# Patient Record
Sex: MALE | Race: BLACK OR AFRICAN AMERICAN | NOT HISPANIC OR LATINO
[De-identification: names, ages, dates, MRNs, and addresses within clinical notes are randomized per-mention and may not be internally consistent; named-entity substitution may affect disease eponyms.]

---

## 2021-03-31 ENCOUNTER — APPOINTMENT (OUTPATIENT)
Dept: DISASTER EMERGENCY | Facility: OTHER | Age: 21
End: 2021-03-31
Payer: COMMERCIAL

## 2021-03-31 PROCEDURE — 0011A: CPT

## 2021-04-28 ENCOUNTER — APPOINTMENT (OUTPATIENT)
Dept: DISASTER EMERGENCY | Facility: OTHER | Age: 21
End: 2021-04-28

## 2021-04-29 ENCOUNTER — APPOINTMENT (OUTPATIENT)
Dept: DISASTER EMERGENCY | Facility: OTHER | Age: 21
End: 2021-04-29
Payer: COMMERCIAL

## 2021-04-29 PROBLEM — Z00.00 ENCOUNTER FOR PREVENTIVE HEALTH EXAMINATION: Status: ACTIVE | Noted: 2021-04-29

## 2021-04-29 PROCEDURE — 0012A: CPT

## 2021-12-01 ENCOUNTER — OUTPATIENT (OUTPATIENT)
Dept: OUTPATIENT SERVICES | Facility: HOSPITAL | Age: 21
LOS: 1 days | End: 2021-12-01
Payer: COMMERCIAL

## 2021-12-01 ENCOUNTER — TRANSCRIPTION ENCOUNTER (OUTPATIENT)
Age: 21
End: 2021-12-01

## 2021-12-01 ENCOUNTER — APPOINTMENT (OUTPATIENT)
Dept: PULMONOLOGY | Facility: CLINIC | Age: 21
End: 2021-12-01
Payer: COMMERCIAL

## 2021-12-01 VITALS
RESPIRATION RATE: 16 BRPM | OXYGEN SATURATION: 99 % | BODY MASS INDEX: 27.17 KG/M2 | DIASTOLIC BLOOD PRESSURE: 66 MMHG | HEART RATE: 62 BPM | HEIGHT: 73 IN | SYSTOLIC BLOOD PRESSURE: 100 MMHG | WEIGHT: 205 LBS

## 2021-12-01 DIAGNOSIS — Z78.9 OTHER SPECIFIED HEALTH STATUS: ICD-10-CM

## 2021-12-01 DIAGNOSIS — R06.02 SHORTNESS OF BREATH: ICD-10-CM

## 2021-12-01 DIAGNOSIS — Z13.9 ENCOUNTER FOR SCREENING, UNSPECIFIED: ICD-10-CM

## 2021-12-01 DIAGNOSIS — Z80.3 FAMILY HISTORY OF MALIGNANT NEOPLASM OF BREAST: ICD-10-CM

## 2021-12-01 DIAGNOSIS — Z82.49 FAMILY HISTORY OF ISCHEMIC HEART DISEASE AND OTHER DISEASES OF THE CIRCULATORY SYSTEM: ICD-10-CM

## 2021-12-01 PROCEDURE — 99203 OFFICE O/P NEW LOW 30 MIN: CPT

## 2021-12-01 PROCEDURE — 71046 X-RAY EXAM CHEST 2 VIEWS: CPT | Mod: 26

## 2021-12-01 RX ORDER — ALBUTEROL SULFATE 90 UG/1
108 (90 BASE) INHALANT RESPIRATORY (INHALATION)
Qty: 1 | Refills: 5 | Status: ACTIVE | COMMUNITY
Start: 2021-12-01 | End: 1900-01-01

## 2021-12-01 NOTE — HISTORY OF PRESENT ILLNESS
[Never] : never [TextBox_4] : 21M with no significant PMH who presents for initial pulmonary evaluation of SOB. He has been feeling SOB with exertion over the past 2 months. He also has some wheezing and dry cough. No inhalational exposures to dust/fumes/chemicals. 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 Denies seasonal allergies. He gets chest tightness with exertion or with laying down. No fevers, no chills. No N/V/D. \par \par Mother - breast cancer\par Father - heart disease\par Maternal grandfather - liver problems

## 2022-01-05 ENCOUNTER — APPOINTMENT (OUTPATIENT)
Dept: PULMONOLOGY | Facility: CLINIC | Age: 22
End: 2022-01-05
Payer: COMMERCIAL

## 2022-01-05 VITALS — BODY MASS INDEX: 28.17 KG/M2 | HEIGHT: 72 IN | WEIGHT: 208 LBS

## 2022-01-05 DIAGNOSIS — R06.02 SHORTNESS OF BREATH: ICD-10-CM

## 2022-01-05 LAB — SARS-COV-2 N GENE NPH QL NAA+PROBE: NOT DETECTED

## 2022-01-05 PROCEDURE — 94729 DIFFUSING CAPACITY: CPT

## 2022-01-05 PROCEDURE — 94727 GAS DIL/WSHOT DETER LNG VOL: CPT

## 2022-01-05 PROCEDURE — 85018 HEMOGLOBIN: CPT | Mod: QW

## 2022-01-05 PROCEDURE — 94010 BREATHING CAPACITY TEST: CPT

## 2022-01-06 ENCOUNTER — APPOINTMENT (OUTPATIENT)
Dept: PULMONOLOGY | Facility: CLINIC | Age: 22
End: 2022-01-06
Payer: COMMERCIAL

## 2022-01-06 VITALS — RESPIRATION RATE: 16 BRPM | HEART RATE: 67 BPM | OXYGEN SATURATION: 99 %

## 2022-01-06 DIAGNOSIS — J45.909 UNSPECIFIED ASTHMA, UNCOMPLICATED: ICD-10-CM

## 2022-01-06 PROCEDURE — 99214 OFFICE O/P EST MOD 30 MIN: CPT

## 2022-01-06 RX ORDER — IPRATROPIUM BROMIDE 17 UG/1
17 AEROSOL, METERED RESPIRATORY (INHALATION) 4 TIMES DAILY
Qty: 1 | Refills: 3 | Status: ACTIVE | COMMUNITY
Start: 2022-01-06 | End: 1900-01-01

## 2022-01-06 RX ORDER — FLUTICASONE FUROATE 200 UG/1
200 POWDER RESPIRATORY (INHALATION) DAILY
Qty: 1 | Refills: 5 | Status: ACTIVE | COMMUNITY
Start: 2022-01-06 | End: 1900-01-01

## 2022-01-06 NOTE — HISTORY OF PRESENT ILLNESS
[Never] : never [TextBox_4] : 21M PMH exercise-induced asthma who presents for f/u. Last visit was started on PRN Albuterol. Had normal CXR. Approximately one week ago felt lightheaded after using the albuterol and passed out, causing a minor concussion, was seen in the ED and sent home. He feels most SOB with exertion. Does not get SOB at baseline. No fevers, no chills, no chest pain, no palpitations, no N/V/D.  [TextBox_29] : Former hookah use

## 2022-01-06 NOTE — REASON FOR VISIT
[Follow-Up] : a follow-up visit [Cough] : cough [Shortness of Breath] : shortness of breath [Asthma] : asthma

## 2022-01-06 NOTE — PROCEDURE
[FreeTextEntry1] : [facility]\par EXAM: XR CHEST PA LAT 2V\par \par \par PROCEDURE DATE: 12/01/2021\par \par \par \par INTERPRETATION: PA and lateral chest radiographs\par \par COMPARISON: NONE.\par \par CLINICAL INFORMATION: Shortness of breath.\par \par FINDINGS:\par \par The airway is midline.\par There are no airspace consolidations.\par No pleural effusion or pneumothorax.\par \par The heart size and mediastinum configuration are within the limits of normal.\par \par The visualized osseus structures are intact.\par \par IMPRESSION:\par \par No acute radiographic cardiopulmonary pathology.\par \par --- End of Report ---\par \par \par \par \par \par \par KEILA DICKENS MD; Attending Radiologist\par This document has been electronically signed. Dec 2 2021 1:53PM\par \par ~~~~~~~~~~~~~~~~~~~~~~~~~~~~~~~~~~~~~~~~~~~~~~~~~~~~~~~~~~~~~~~~~~~~~~~~\par \par

## 2022-04-08 ENCOUNTER — APPOINTMENT (OUTPATIENT)
Dept: PULMONOLOGY | Facility: CLINIC | Age: 22
End: 2022-04-08

## 2022-05-03 ENCOUNTER — RX RENEWAL (OUTPATIENT)
Age: 22
End: 2022-05-03

## 2024-01-10 ENCOUNTER — OFFICE VISIT (OUTPATIENT)
Dept: URGENT CARE | Facility: CLINIC | Age: 24
End: 2024-01-10
Payer: COMMERCIAL

## 2024-01-10 VITALS
OXYGEN SATURATION: 96 % | DIASTOLIC BLOOD PRESSURE: 68 MMHG | RESPIRATION RATE: 18 BRPM | HEART RATE: 71 BPM | TEMPERATURE: 98.7 F | SYSTOLIC BLOOD PRESSURE: 116 MMHG

## 2024-01-10 DIAGNOSIS — R05.9 COUGH, UNSPECIFIED TYPE: Primary | ICD-10-CM

## 2024-01-10 LAB
SARS-COV-2 AG UPPER RESP QL IA: NEGATIVE
VALID CONTROL: NORMAL

## 2024-01-10 PROCEDURE — 87811 SARS-COV-2 COVID19 W/OPTIC: CPT

## 2024-01-10 PROCEDURE — 99204 OFFICE O/P NEW MOD 45 MIN: CPT

## 2024-01-10 RX ORDER — FLUTICASONE PROPIONATE AND SALMETEROL 250; 50 UG/1; UG/1
1 POWDER RESPIRATORY (INHALATION) 2 TIMES DAILY
COMMUNITY

## 2024-01-10 RX ORDER — BENZONATATE 100 MG/1
100 CAPSULE ORAL 3 TIMES DAILY PRN
Qty: 20 CAPSULE | Refills: 0 | Status: SHIPPED | OUTPATIENT
Start: 2024-01-10

## 2024-01-10 RX ORDER — ALBUTEROL SULFATE 90 UG/1
2 AEROSOL, METERED RESPIRATORY (INHALATION) EVERY 6 HOURS PRN
COMMUNITY
Start: 2023-10-23 | End: 2024-10-22

## 2024-01-10 NOTE — PROGRESS NOTES
[facility]        NAME: Krzysztof Mercer is a 23 y.o. male  : 2000    MRN: [id number]  DATE: January 10, 2024  TIME: 5:03 PM    Assessment and Plan   Cough, unspecified type [R05.9]  1. Cough, unspecified type              Patient Instructions     Upper respiratory infection  Tessalon pearls as needed  Follow up with PCP in 3-5 days.  Proceed to  ER if symptoms worsen.    Chief Complaint     Chief Complaint   Patient presents with    Cold Like Symptoms     Pt c/o cough, sneezing, chest congestion that started on . Has taken dayquil and helped alittle bit         History of Present Illness       23-year-old male who presents complaining of cough, congestion, fatigue, sneezing x 3 days.  Denies chest pain, shortness of breath, nausea, vomiting.  Has been taking over-the-counter medications with minimal relief.        Review of Systems   Review of Systems   Constitutional: Negative.    HENT:  Positive for congestion and rhinorrhea. Negative for dental problem, drooling, ear pain, postnasal drip, sinus pain, sore throat, trouble swallowing and voice change.    Eyes: Negative.    Respiratory:  Positive for cough. Negative for apnea, choking, chest tightness, shortness of breath, wheezing and stridor.    Cardiovascular: Negative.  Negative for chest pain.         Current Medications       Current Outpatient Medications:     albuterol (PROVENTIL HFA,VENTOLIN HFA) 90 mcg/act inhaler, Inhale 2 puffs every 6 (six) hours as needed, Disp: , Rfl:     Wixela Inhub 250-50 MCG/ACT inhaler, Inhale 1 puff 2 (two) times a day, Disp: , Rfl:     Current Allergies     Allergies as of 01/10/2024    (No Known Allergies)            The following portions of the patient's history were reviewed and updated as appropriate: allergies, current medications, past family history, past medical history, past social history, past surgical history and problem list.     Past Medical History:   Diagnosis Date    Asthma        History  reviewed. No pertinent surgical history.    History reviewed. No pertinent family history.      Medications have been verified.        Objective   /68   Pulse 71   Temp 98.7 °F (37.1 °C) (Tympanic)   Resp 18   SpO2 96%        Physical Exam     Physical Exam  Constitutional:       General: He is not in acute distress.     Appearance: Normal appearance. He is well-developed. He is not diaphoretic.   HENT:      Head: Normocephalic and atraumatic.      Right Ear: Hearing, tympanic membrane, ear canal and external ear normal.      Left Ear: Hearing, tympanic membrane, ear canal and external ear normal.      Nose: Rhinorrhea present.      Right Sinus: No maxillary sinus tenderness or frontal sinus tenderness.      Left Sinus: No maxillary sinus tenderness or frontal sinus tenderness.      Mouth/Throat:      Pharynx: Uvula midline.   Cardiovascular:      Rate and Rhythm: Normal rate and regular rhythm.      Heart sounds: Normal heart sounds.   Pulmonary:      Effort: Pulmonary effort is normal. No respiratory distress.      Breath sounds: Normal breath sounds. No stridor. No wheezing, rhonchi or rales.   Chest:      Chest wall: No tenderness.   Musculoskeletal:      Cervical back: Normal range of motion and neck supple.   Lymphadenopathy:      Cervical: No cervical adenopathy.   Neurological:      Mental Status: He is alert.

## 2024-01-10 NOTE — LETTER
January 10, 2024     Patient: Krzysztof Mercer   YOB: 2000   Date of Visit: 1/10/2024       To Whom it May Concern:    Krzysztof Mercer was seen in my clinic on 1/10/2024. He may return to work when he is fever free x 24 hours without fever reducing medication.    If you have any questions or concerns, please don't hesitate to call.         Sincerely,          ROCCO Elias        CC: No Recipients

## 2024-01-10 NOTE — PATIENT INSTRUCTIONS
Upper respiratory infection  Tessalon pearls as needed  Follow up with PCP in 3-5 days.  Proceed to  ER if symptoms worsen.